# Patient Record
Sex: MALE | Race: WHITE
[De-identification: names, ages, dates, MRNs, and addresses within clinical notes are randomized per-mention and may not be internally consistent; named-entity substitution may affect disease eponyms.]

---

## 2017-07-03 ENCOUNTER — HOSPITAL ENCOUNTER (EMERGENCY)
Dept: HOSPITAL 1 - ED | Age: 27
Discharge: HOME | End: 2017-07-03
Payer: COMMERCIAL

## 2017-07-03 VITALS — SYSTOLIC BLOOD PRESSURE: 129 MMHG | DIASTOLIC BLOOD PRESSURE: 96 MMHG

## 2017-07-03 VITALS — WEIGHT: 129.98 LBS | HEIGHT: 66 IN | BODY MASS INDEX: 20.89 KG/M2

## 2017-07-03 DIAGNOSIS — K04.7: Primary | ICD-10-CM

## 2017-09-06 ENCOUNTER — HOSPITAL ENCOUNTER (EMERGENCY)
Dept: HOSPITAL 26 - MED | Age: 27
LOS: 1 days | Discharge: HOME | End: 2017-09-07
Payer: COMMERCIAL

## 2017-09-06 VITALS — BODY MASS INDEX: 21.69 KG/M2 | WEIGHT: 135 LBS | HEIGHT: 66 IN

## 2017-09-06 VITALS — SYSTOLIC BLOOD PRESSURE: 140 MMHG | DIASTOLIC BLOOD PRESSURE: 90 MMHG
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
## 2017-09-06 DIAGNOSIS — S60.111A: Primary | ICD-10-CM

## 2017-09-06 DIAGNOSIS — R03.0: ICD-10-CM
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
## 2017-09-06 PROCEDURE — 90471 IMMUNIZATION ADMIN: CPT

## 2017-09-06 PROCEDURE — 73130 X-RAY EXAM OF HAND: CPT

## 2017-09-06 PROCEDURE — 90715 TDAP VACCINE 7 YRS/> IM: CPT

## 2017-09-06 PROCEDURE — 99284 EMERGENCY DEPT VISIT MOD MDM: CPT

## 2017-09-06 PROCEDURE — 11740 EVACUATION SUBUNGUAL HMTMA: CPT

## 2017-09-07 VITALS — DIASTOLIC BLOOD PRESSURE: 85 MMHG | SYSTOLIC BLOOD PRESSURE: 130 MMHG
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
## 2019-06-19 ENCOUNTER — HOSPITAL ENCOUNTER (EMERGENCY)
Dept: HOSPITAL 26 - MED | Age: 29
LOS: 1 days | Discharge: HOME | End: 2019-06-20
Payer: SELF-PAY

## 2019-06-19 VITALS — WEIGHT: 140 LBS | HEIGHT: 66 IN | BODY MASS INDEX: 22.5 KG/M2

## 2019-06-19 VITALS — DIASTOLIC BLOOD PRESSURE: 90 MMHG | SYSTOLIC BLOOD PRESSURE: 137 MMHG

## 2019-06-19 DIAGNOSIS — J45.909: ICD-10-CM

## 2019-06-19 DIAGNOSIS — L23.9: Primary | ICD-10-CM

## 2019-06-19 PROCEDURE — 99283 EMERGENCY DEPT VISIT LOW MDM: CPT

## 2019-06-19 PROCEDURE — 96372 THER/PROPH/DIAG INJ SC/IM: CPT

## 2019-06-19 NOTE — NUR
PT PRESENTS TO THE ED WITH C/O BUE AND BLE RASHES X2 DAYS. PATIENT STATES HE 
HAS HX OF PSORIASIS AND CHICKEN POX. ETHYMA NOTED TO BUE AND BLE. NO DRAINAGE 
NOTED. NO S/S OF DISTRESS AT THIS TIME. PT DENIES PAIN

## 2019-06-20 VITALS — DIASTOLIC BLOOD PRESSURE: 90 MMHG | SYSTOLIC BLOOD PRESSURE: 137 MMHG

## 2019-06-20 NOTE — NUR
Patient discharged with v/s stable. Written and verbal after care instructions 
given and explained. 

Patient alert, oriented and verbalized understanding of instructions. 
Ambulatory with steady gait. All questions addressed prior to discharge. ID 
band removed. Patient advised to follow up with PMD. Rx of DIPHENHYDRAMINE AND 
PREDNISONE given. Patient educated on indication of medication including 
possible reaction and side effects. Opportunity to ask questions provided and 
answered.

## 2020-06-30 ENCOUNTER — HOSPITAL ENCOUNTER (EMERGENCY)
Dept: HOSPITAL 26 - MED | Age: 30
Discharge: HOME | End: 2020-06-30
Payer: SELF-PAY

## 2020-06-30 VITALS — HEIGHT: 66 IN | BODY MASS INDEX: 22.5 KG/M2 | WEIGHT: 140 LBS

## 2020-06-30 VITALS — DIASTOLIC BLOOD PRESSURE: 81 MMHG | SYSTOLIC BLOOD PRESSURE: 121 MMHG

## 2020-06-30 DIAGNOSIS — Z20.828: ICD-10-CM

## 2020-06-30 DIAGNOSIS — R50.9: Primary | ICD-10-CM

## 2020-06-30 DIAGNOSIS — R05: ICD-10-CM

## 2020-06-30 DIAGNOSIS — J45.909: ICD-10-CM

## 2020-06-30 DIAGNOSIS — M79.10: ICD-10-CM

## 2020-06-30 PROCEDURE — 99283 EMERGENCY DEPT VISIT LOW MDM: CPT

## 2020-06-30 NOTE — NUR
concerned over mother and sibiling tested positive for COVIG-19 , tested last 
week and resulted yesterday as per pt

c/o n/v, diarrhea, bodyaches, headache mild---

## 2020-08-28 ENCOUNTER — HOSPITAL ENCOUNTER (EMERGENCY)
Dept: HOSPITAL 1 - ED | Age: 30
Discharge: HOME | End: 2020-08-28
Payer: MEDICAID

## 2020-08-28 VITALS — SYSTOLIC BLOOD PRESSURE: 142 MMHG | DIASTOLIC BLOOD PRESSURE: 106 MMHG

## 2020-08-28 VITALS — HEIGHT: 66 IN | BODY MASS INDEX: 21.6 KG/M2 | WEIGHT: 134.37 LBS

## 2020-08-28 DIAGNOSIS — K64.9: ICD-10-CM

## 2020-08-28 DIAGNOSIS — J45.909: ICD-10-CM

## 2020-08-28 DIAGNOSIS — L40.9: Primary | ICD-10-CM

## 2023-05-24 NOTE — NUR
Patient discharged with v/s stable. Written and verbal after care instructions 
given and explained. 

Patient verbalized understanding. Ambulatory with steady gait. All questions 
addressed prior to discharge. Advised to follow up with PMD. Low Dose Naltrexone Counseling- I discussed with the patient the potential risks and side effects of low dose naltrexone including but not limited to: more vivid dreams, headaches, nausea, vomiting, abdominal pain, fatigue, dizziness, and anxiety.

## 2024-01-30 ENCOUNTER — HOSPITAL ENCOUNTER (EMERGENCY)
Dept: HOSPITAL 26 - MED | Age: 34
Discharge: HOME | End: 2024-01-30
Payer: COMMERCIAL

## 2024-01-30 VITALS
SYSTOLIC BLOOD PRESSURE: 143 MMHG | OXYGEN SATURATION: 98 % | TEMPERATURE: 97.5 F | RESPIRATION RATE: 20 BRPM | HEART RATE: 73 BPM | DIASTOLIC BLOOD PRESSURE: 98 MMHG

## 2024-01-30 VITALS — WEIGHT: 136 LBS | HEIGHT: 66 IN | BODY MASS INDEX: 21.86 KG/M2

## 2024-01-30 DIAGNOSIS — A53.9: Primary | ICD-10-CM

## 2024-01-30 DIAGNOSIS — Z79.899: ICD-10-CM

## 2024-01-30 DIAGNOSIS — R21: ICD-10-CM

## 2024-01-30 PROCEDURE — 36415 COLL VENOUS BLD VENIPUNCTURE: CPT

## 2024-01-30 PROCEDURE — 86592 SYPHILIS TEST NON-TREP QUAL: CPT

## 2024-01-30 PROCEDURE — 87529 HSV DNA AMP PROBE: CPT

## 2024-01-30 PROCEDURE — 99283 EMERGENCY DEPT VISIT LOW MDM: CPT

## 2024-01-30 PROCEDURE — 96372 THER/PROPH/DIAG INJ SC/IM: CPT

## 2024-01-30 PROCEDURE — 87081 CULTURE SCREEN ONLY: CPT

## 2024-02-06 ENCOUNTER — HOSPITAL ENCOUNTER (EMERGENCY)
Dept: HOSPITAL 26 - MED | Age: 34
Discharge: HOME | End: 2024-02-06
Payer: COMMERCIAL

## 2024-02-06 VITALS
HEART RATE: 95 BPM | TEMPERATURE: 98 F | RESPIRATION RATE: 18 BRPM | SYSTOLIC BLOOD PRESSURE: 124 MMHG | DIASTOLIC BLOOD PRESSURE: 89 MMHG | OXYGEN SATURATION: 98 %

## 2024-02-06 VITALS — BODY MASS INDEX: 25.61 KG/M2 | HEIGHT: 64 IN | WEIGHT: 150 LBS

## 2024-02-06 DIAGNOSIS — Z79.899: ICD-10-CM

## 2024-02-06 DIAGNOSIS — A53.9: Primary | ICD-10-CM

## 2024-02-06 PROCEDURE — 99283 EMERGENCY DEPT VISIT LOW MDM: CPT

## 2024-02-06 PROCEDURE — 96374 THER/PROPH/DIAG INJ IV PUSH: CPT
